# Patient Record
Sex: FEMALE | URBAN - METROPOLITAN AREA
[De-identification: names, ages, dates, MRNs, and addresses within clinical notes are randomized per-mention and may not be internally consistent; named-entity substitution may affect disease eponyms.]

---

## 2018-05-31 ENCOUNTER — IMPORTED ENCOUNTER (OUTPATIENT)
Dept: URBAN - METROPOLITAN AREA CLINIC 1 | Facility: CLINIC | Age: 68
End: 2018-05-31

## 2018-05-31 PROBLEM — H52.03: Noted: 2018-05-31

## 2018-05-31 PROBLEM — H52.4: Noted: 2018-05-31

## 2018-05-31 PROCEDURE — S0620 ROUTINE OPHTHALMOLOGICAL EXA: HCPCS

## 2018-05-31 NOTE — PATIENT DISCUSSION
1.  Hyperopia: Rx was given for corrective spectacles if indicated. 2.  [id number]. Cataract OU - Observe Return for an appointment in 1 year 36 with Dr. Lobo Corbin.

## 2019-06-06 ENCOUNTER — IMPORTED ENCOUNTER (OUTPATIENT)
Dept: URBAN - METROPOLITAN AREA CLINIC 1 | Facility: CLINIC | Age: 69
End: 2019-06-06

## 2019-06-06 PROBLEM — H52.03: Noted: 2019-06-06

## 2019-06-06 PROCEDURE — S0621 ROUTINE OPHTHALMOLOGICAL EXA: HCPCS

## 2019-06-06 NOTE — PATIENT DISCUSSION
1.  Hyperopia OU- Rx for glasses given. 2.  Cataracts OU- observe. 3.  ROSE MARIE OU- Use ATs TID OU Routinely. 4.  Ophthalmic Migraine- Reassurance. Return for an appointment in 1 yr 36 with Dr. Roz Gomez.

## 2020-06-09 ENCOUNTER — IMPORTED ENCOUNTER (OUTPATIENT)
Dept: URBAN - METROPOLITAN AREA CLINIC 1 | Facility: CLINIC | Age: 70
End: 2020-06-09

## 2020-06-09 PROBLEM — H25.13: Noted: 2020-06-09

## 2020-06-09 PROBLEM — H16.143: Noted: 2020-06-09

## 2020-06-09 PROBLEM — H04.123: Noted: 2020-06-09

## 2020-06-09 PROCEDURE — 92014 COMPRE OPH EXAM EST PT 1/>: CPT

## 2020-06-09 PROCEDURE — 92015 DETERMINE REFRACTIVE STATE: CPT

## 2020-06-09 NOTE — PATIENT DISCUSSION
1.  Cataract OU --  Observe for now without intervention. The patient was advised to contact us if any change or worsening of vision2. ROSE MARIE w/ PEK OU -- Recommend the use of ATs BID OU routinely. Sample Refresh Relieva and Systane given. 3.  H/o Ocular Migraine w/ AuraFinalized glasses mrx today. Return for an appointment in 1 year for a 30/glare with Dr. Reece Kovacs.

## 2021-05-28 NOTE — PATIENT DISCUSSION
The patient had a lesion on the right lower eyelid. After informed consent the lesion was anesthetized with local anesthetic, 1% lidocane with epinephrine 1:100,000 units. Sterile technique was used to remove the lesion with Marija scissors. Antibiotic ointment was used to treat the area where lesion was removed. Lesion was sent to pathology for analysis. The patient was given written post operative wound care instructions and a prescription for antibiotic ointment. The patient was asked to call  within 10 days if they had not been otherwise called by our office with the result of the biopsy.

## 2021-05-28 NOTE — PATIENT DISCUSSION
PHOTOGRAPHS: I have reviewed the external ocular photographs of this patient which show the following: lesion involving the right lower eyelid.

## 2021-06-08 ENCOUNTER — IMPORTED ENCOUNTER (OUTPATIENT)
Dept: URBAN - METROPOLITAN AREA CLINIC 1 | Facility: CLINIC | Age: 71
End: 2021-06-08

## 2021-06-08 PROBLEM — H16.143: Noted: 2021-06-08

## 2021-06-08 PROBLEM — H25.813: Noted: 2021-06-08

## 2021-06-08 PROBLEM — H04.123: Noted: 2021-06-08

## 2021-06-08 PROCEDURE — 99214 OFFICE O/P EST MOD 30 MIN: CPT

## 2021-06-08 PROCEDURE — 92015 DETERMINE REFRACTIVE STATE: CPT

## 2021-06-08 NOTE — PATIENT DISCUSSION
1.  Cataract OU -- Patient not having any glare issues at this time. Will cont to observe for now without intervention. The patient was advised to contact us if any change or worsening of vision2. ROSE MARIE w/ PEK OU -- Recommend the use of ATs BID OU routinely. Sample Refresh Relieva and Systane given. 3.  H/o Ocular Migraine w/ Aura Finalized glasses Mrx today. Return for an appointment in 1 year for a 30/glare with Dr. Mercedes Das.

## 2021-09-02 ENCOUNTER — IMPORTED ENCOUNTER (OUTPATIENT)
Dept: URBAN - METROPOLITAN AREA CLINIC 1 | Facility: CLINIC | Age: 71
End: 2021-09-02

## 2021-09-02 PROBLEM — H16.143: Noted: 2021-09-02

## 2021-09-02 PROBLEM — H04.123: Noted: 2021-09-02

## 2021-09-02 PROBLEM — H25.813: Noted: 2021-09-02

## 2021-09-02 PROCEDURE — 99213 OFFICE O/P EST LOW 20 MIN: CPT

## 2021-09-02 PROCEDURE — 92015 DETERMINE REFRACTIVE STATE: CPT

## 2021-09-02 NOTE — PATIENT DISCUSSION
1.  Cataract OU -- Visually Significant secondary to glare discussed the risks benefits alternatives and limitations of cataract surgery. The patient stated a full understanding and a desire to proceed with the procedure. Discussed with patient if PO Gtts are more than $120 for all three combined when filling at their Pharmacy please call our office to request generic substitutions. **Consider Viral RUBIN Phaco PCL OS then OD 2. ROSE MARIE w/ PEK OU -- Cont ATs QID OU routinely. 3.  H/o Ocular Migraine w/ Aura  Return for an appointment Ascan OS then OD with Dr. Rafael Hickman.

## 2021-09-28 ENCOUNTER — IMPORTED ENCOUNTER (OUTPATIENT)
Dept: URBAN - METROPOLITAN AREA CLINIC 1 | Facility: CLINIC | Age: 71
End: 2021-09-28

## 2021-09-28 PROBLEM — H25.812: Noted: 2021-09-28

## 2021-09-28 PROCEDURE — 92136 OPHTHALMIC BIOMETRY: CPT

## 2021-09-28 NOTE — PATIENT DISCUSSION
1. Cataract OS:  Visually Significant secondary to glare discussed the risks benefits alternatives and limitations of cataract surgery. The patient stated a full understanding and a desire to proceed with the procedure. Discussed with patient if PO Gtts are more than $120 for all three combined when filling at their Pharmacy please call our office to request generic substitutions. Discussed with patient and patient understands glare may be expected post-op particularly at night with the MF lens choice. There may be a need for OTC readers for near work/fine print. Pt understood. Phaco PCL OS  Lifestyle Questionnaire Completed. Return for an appointment for Return as scheduled with Dr. Roz Gomez.

## 2021-10-06 ENCOUNTER — IMPORTED ENCOUNTER (OUTPATIENT)
Dept: URBAN - METROPOLITAN AREA CLINIC 1 | Facility: CLINIC | Age: 71
End: 2021-10-06

## 2021-10-07 ENCOUNTER — IMPORTED ENCOUNTER (OUTPATIENT)
Dept: URBAN - METROPOLITAN AREA CLINIC 1 | Facility: CLINIC | Age: 71
End: 2021-10-07

## 2021-10-07 PROBLEM — Z96.1: Noted: 2021-10-07

## 2021-10-07 PROCEDURE — 99024 POSTOP FOLLOW-UP VISIT: CPT

## 2021-10-07 NOTE — PATIENT DISCUSSION
POD#1 CE/IOL Vivity Toric OS doing well. [place] Raring in place. Use Tobradex ST BID OS and Prolensa Qdaily OS: Use gtts through completion of PO gtt chart regimen/ Per our instructions given to patient.   Post op Warnings Reiterated RTC as scheduled

## 2021-10-15 ENCOUNTER — IMPORTED ENCOUNTER (OUTPATIENT)
Dept: URBAN - METROPOLITAN AREA CLINIC 1 | Facility: CLINIC | Age: 71
End: 2021-10-15

## 2021-10-15 PROBLEM — H25.811: Noted: 2021-10-15

## 2021-10-15 PROCEDURE — 92136 OPHTHALMIC BIOMETRY: CPT

## 2021-10-15 NOTE — PATIENT DISCUSSION
1.  Cataract OD: Visually Significant secondary to glare discussed the risks benefits alternatives and limitations of cataract surgery. The patient stated a full understanding and a desire to proceed with the procedure. Discussed with patient if PO Gtts are more than $120 for all three combined when filling at their Pharmacy please call our office to request generic substitutions. Discussed with patient and patient understands glare may be expected post-op particularly at night with the MF lens choice. There may be a need for OTC readers for near work/fine print. Pt understood. Phaco PCL OD 2. POW#3  CE/IOL OS (Vivity Toric MF) doing well. Use Tobradex ST BID OS & Prolensa Qdaily OS: Use through completion of po gtt regimen.  F/u as scheduled 2nd eye

## 2021-10-22 ENCOUNTER — IMPORTED ENCOUNTER (OUTPATIENT)
Dept: URBAN - METROPOLITAN AREA CLINIC 1 | Facility: CLINIC | Age: 71
End: 2021-10-22

## 2021-10-23 ENCOUNTER — IMPORTED ENCOUNTER (OUTPATIENT)
Dept: URBAN - METROPOLITAN AREA CLINIC 1 | Facility: CLINIC | Age: 71
End: 2021-10-23

## 2021-10-23 PROBLEM — Z96.1: Noted: 2021-10-23

## 2021-10-23 PROCEDURE — 99024 POSTOP FOLLOW-UP VISIT: CPT

## 2021-10-23 NOTE — PATIENT DISCUSSION
1. POD#1 Phaco/ PCL Vivity MF OD- doing well. Dextenza Use Tobradex ST BID OD Prolensa Qdaily OD: Use all three gtts through completion of PO gtt chart regimen/ Per our instructions given. Post op Warnings Reiterated 2. POW#3 Phaco/ PCL Vivty Toric MF OS- doing well.  Dextenza Cont Tobradex ST BID OS and Prolensa Qdaily OSRTC as scheduled

## 2021-11-18 ENCOUNTER — IMPORTED ENCOUNTER (OUTPATIENT)
Dept: URBAN - METROPOLITAN AREA CLINIC 1 | Facility: CLINIC | Age: 71
End: 2021-11-18

## 2021-11-18 PROBLEM — Z09: Noted: 2021-11-18

## 2021-11-18 PROCEDURE — 99024 POSTOP FOLLOW-UP VISIT: CPT

## 2021-11-18 NOTE — PATIENT DISCUSSION
POW#3 Phaco/ PCL OU (Vivity MF OD/Vivity MF OS) -- Doing well. Finish PO meds per PO gtt schedule MRX for glasses givenReturn for an appointment in June 30 with Dr. James Bowles.

## 2022-04-02 ASSESSMENT — VISUAL ACUITY
OS_CC: 20/20-2
OD_SC: 20/20
OS_CC: 20/25-1
OD_GLARE: 20/70
OD_CC: 20/20+2
OS_CC: J1
OD_CC: J1
OD_CC: 20/50
OS_CC: 20/80
OS_SC: 20/30
OS_SC: 20/20-1
OD_SC: 20/25
OS_CC: 20/20
OD_SC: 20/20
OD_SC: 20/20
OS_GLARE: 20/50
OS_SC: J1
OD_SC: 20/20-2
OS_GLARE: 20/70
OD_CC: J1
OD_GLARE: 20/70
OS_SC: 20/25
OD_SC: J5
OD_CC: 20/30
OD_CC: 20/60
OS_CC: J1
OD_CC: J1
OD_GLARE: 20/40
OS_CC: 20/20
OS_SC: 20/25
OS_SC: 20/30
OS_CC: J1

## 2022-04-02 ASSESSMENT — TONOMETRY
OS_IOP_MMHG: 13
OD_IOP_MMHG: 13
OD_IOP_MMHG: 14
OD_IOP_MMHG: 13
OD_IOP_MMHG: 13
OS_IOP_MMHG: 13
OS_IOP_MMHG: 13
OD_IOP_MMHG: 14
OS_IOP_MMHG: 15
OS_IOP_MMHG: 10
OS_IOP_MMHG: 13
OS_IOP_MMHG: 15
OD_IOP_MMHG: 14
OD_IOP_MMHG: 11
OS_IOP_MMHG: 14